# Patient Record
Sex: MALE | Race: WHITE
[De-identification: names, ages, dates, MRNs, and addresses within clinical notes are randomized per-mention and may not be internally consistent; named-entity substitution may affect disease eponyms.]

---

## 2018-06-27 ENCOUNTER — HOSPITAL ENCOUNTER (EMERGENCY)
Dept: HOSPITAL 58 - ED | Age: 38
Discharge: HOME | End: 2018-06-27

## 2018-06-27 VITALS — BODY MASS INDEX: 27.9 KG/M2

## 2018-06-27 VITALS — DIASTOLIC BLOOD PRESSURE: 94 MMHG | SYSTOLIC BLOOD PRESSURE: 151 MMHG | TEMPERATURE: 97.1 F

## 2018-06-27 DIAGNOSIS — L02.415: Primary | ICD-10-CM

## 2018-06-27 PROCEDURE — 99282 EMERGENCY DEPT VISIT SF MDM: CPT

## 2018-06-27 NOTE — ED.PDOC
General


ED Provider: 


Dr. LESLIE BAIRD





Chief Complaint: Abscess


Stated Complaint: abscess  right knee


Time Seen by Physician: 09:20 (see photos)


Mode of Arrival: Walk-In


Information Source: Patient


Exam Limitations: No limitations


Nursing and Triage Documentation Reviewed and Agree: Yes


Does patient meet sepsis criteria?: No


If yes, has appropriate treatment been initiated?: No


System Inflammatory Response Syndrome: Not Applicable


Sepsis Protocol: 


For patient's 13 years and over:





Temp is 96.8 and below  and greater


Pulse >90 BPM


Resp >20/minute


Acutely Altered Mental Status





Are patient's symptoms suggestive of a new infection, such as:


   -Pneumonia


   -Skin, Soft Tissue


   -Endocarditis


   -UTI


   -Bone, Joint Infection


   -Implantable Device


   -Acute Abdominal Infection


   -Wound Infection


   -Meningitis


   -Blood Stream Catheter Infection


   -Unknown








Skin Complaint Exam





- Skin/Soft Tissue Complaint/Exam


Onset/Duration: 3 days


Symptoms Are: Still present


Timing: Constant


Initial Severity: Moderate


Current Severity: Moderate


Character: Reports: Swelling, Raised, Painful


Aggravating: Reports: Touch


Alleviating: Reports: None


Associated Signs and Symptoms: Denies: Fever, Chills, Itching, Drainage, 

Bruising, Tenderness, Red streaks, Joint swelling


Related History: Reports: Similar episode


Related Surgical History: Reports: None


Recent Exposure to Others w/Similar Symptoms: No


Skin Findings: Present: Pustules


Differential Diagnoses: Abscess





Review of Systems





- Review Of Systems


Constitutional: Reports: No symptoms


Eyes: Reports: No symptoms


Ears, Nose, Mouth, Throat: Reports: No symptoms


Respiratory: Reports: No symptoms


Cardiac: Reports: No symptoms


GI: Reports: No symptoms


: Reports: No symptoms


Musculoskeletal: Reports: No symptoms


Skin: Reports: Other (abscess see photos)


Neurological: Reports: No symptoms


Endocrine: Reports: No symptoms


Hematologic/Lymphatic: Reports: No symptoms


All Other Systems: Reviewed and Negative





Past Medical History





- Past Medical History


Previously Healthy: Yes


Endocrine: Reports: None


Cardiovascular: Reports: None


Respiratory: Reports: None


Hematological: Reports: None


Gastrointestinal: Reports: None


Genitourinary: Reports: None


Neuro/Psych: Reports: None


Musculoskeletal: Reports: None


Cancer: Reports: None





- Surgical History


General Surgical History: Reports: None





- Family History


Family History: Reports: None





- Social History


Smoking Status: Former smoker


Hx Substance Use: No


Alcohol Screening: Occasionally





Physical Exam





- Physical Exam


Appearance: Well-appearing, No pain distress, Well-nourished


Eyes: NAGI, EOMI, Conjunctiva clear


ENT: Ears normal, Nose normal, Oropharynx normal


Respiratory: Airway patent, Breath sounds clear, Breath sounds equal, 

Respirations nonlabored


Cardiovascular: RRR, Pulses normal, No rub, No murmur


GI/: Soft, Nontender, No masses, Bowel sounds normal, No Organomegaly


Musculoskeletal: Normal strength, ROM intact, No edema, No calf tenderness


Skin: Warm, Dry (3 cm abscessr knee seephotos)


Neurological: Sensation intact, Motor intact, Reflexes intact, Cranial nerves 

intact, Alert, Oriented


Psychiatric: Affect appropriate, Mood appropriate





Critical Care Note





- Critical Care Note


Total Time (mins): 0





Course





- Course


Vital Signs: 





 











  Temp Pulse Resp BP Pulse Ox


 


 06/27/18 09:15  97.1 F L  103 H  18  151/94 H  98














Departure





- Departure


Time of Disposition: 09:31 (with mia nazario RN in the room i/d discussed pt 

refused procedure risks discussed still refused )


Disposition: HOME SELF-CARE


Discharge Problem: 


 Abscess





Instructions:  Abscess (ED)


Condition: Good


Pt referred to PMD for follow-up: Yes


IPMP verified?: No


Additional Instructions: 


Please call your Family Physician as soon as possible to schedule a follow-up 

appointment.


Allergies/Adverse Reactions: 


Allergies





No Known Allergies Allergy (Verified 06/27/18 09:17)


 








Home Medications: 


Ambulatory Orders





1 [No Reported Medications]  06/27/18 








Disposition Discussed With: Patient